# Patient Record
Sex: FEMALE | Race: WHITE
[De-identification: names, ages, dates, MRNs, and addresses within clinical notes are randomized per-mention and may not be internally consistent; named-entity substitution may affect disease eponyms.]

---

## 2018-07-11 ENCOUNTER — HOSPITAL ENCOUNTER (INPATIENT)
Dept: HOSPITAL 1 - ED | Age: 58
LOS: 3 days | Discharge: HOME | DRG: 233 | End: 2018-07-14
Attending: FAMILY MEDICINE | Admitting: FAMILY MEDICINE
Payer: COMMERCIAL

## 2018-07-11 VITALS — HEIGHT: 62.99 IN | BODY MASS INDEX: 42.73 KG/M2 | WEIGHT: 241.19 LBS

## 2018-07-11 DIAGNOSIS — I10: ICD-10-CM

## 2018-07-11 DIAGNOSIS — Z88.6: ICD-10-CM

## 2018-07-11 DIAGNOSIS — E78.00: ICD-10-CM

## 2018-07-11 DIAGNOSIS — K35.2: Primary | ICD-10-CM

## 2018-07-11 DIAGNOSIS — Z90.49: ICD-10-CM

## 2018-07-11 DIAGNOSIS — J45.909: ICD-10-CM

## 2018-07-11 DIAGNOSIS — E78.1: ICD-10-CM

## 2018-07-11 DIAGNOSIS — E66.01: ICD-10-CM

## 2018-07-11 DIAGNOSIS — Z98.51: ICD-10-CM

## 2018-07-11 DIAGNOSIS — F32.9: ICD-10-CM

## 2018-07-11 DIAGNOSIS — F17.210: ICD-10-CM

## 2018-07-11 DIAGNOSIS — E11.65: ICD-10-CM

## 2018-07-12 VITALS — SYSTOLIC BLOOD PRESSURE: 99 MMHG | DIASTOLIC BLOOD PRESSURE: 70 MMHG

## 2018-07-12 VITALS — DIASTOLIC BLOOD PRESSURE: 54 MMHG | SYSTOLIC BLOOD PRESSURE: 102 MMHG

## 2018-07-12 VITALS — DIASTOLIC BLOOD PRESSURE: 53 MMHG | SYSTOLIC BLOOD PRESSURE: 98 MMHG

## 2018-07-12 VITALS — DIASTOLIC BLOOD PRESSURE: 68 MMHG | SYSTOLIC BLOOD PRESSURE: 116 MMHG

## 2018-07-12 VITALS — DIASTOLIC BLOOD PRESSURE: 72 MMHG | SYSTOLIC BLOOD PRESSURE: 124 MMHG

## 2018-07-12 LAB
ALBUMIN SERPL-MCNC: 3.9 G/DL (ref 3.4–5)
ALP SERPL-CCNC: 72 U/L (ref 46–116)
ALT SERPL-CCNC: 28 U/L (ref 14–59)
AMPHETAMINES UR QL SCN: (no result)
AMYLASE SERPL-CCNC: 47 U/L (ref 25–115)
AST SERPL-CCNC: 17 U/L (ref 15–37)
BASOPHILS NFR BLD: 0.5 % (ref 0–2)
BILIRUB SERPL-MCNC: 0.34 MG/DL (ref 0.2–1)
BUN SERPL-MCNC: 11 MG/DL (ref 7–18)
CALCIUM SERPL-MCNC: 9.5 MG/DL (ref 8.5–10.1)
CHLORIDE SERPL-SCNC: 99 MMOL/L (ref 98–107)
CHOLEST SERPL-MCNC: 147 MG/DL (ref ?–200)
CHOLEST/HDLC SERPL: 4.7 MG/DL
CO2 SERPL-SCNC: 27.6 MMOL/L (ref 21–32)
CREAT SERPL-MCNC: 1 MG/DL (ref 0.6–1)
ERYTHROCYTE [DISTWIDTH] IN BLOOD BY AUTOMATED COUNT: 14.2 % (ref 11.5–14.5)
GFR SERPLBLD BASED ON 1.73 SQ M-ARVRAT: > 60 ML/MIN
GLUCOSE SERPL-MCNC: 124 MG/DL (ref 74–106)
HDLC SERPL-MCNC: 31 MG/DL (ref 40–60)
LIPASE SERPL-CCNC: 212 IU/L (ref 73–393)
MAGNESIUM SERPL-MCNC: 1.9 MG/DL (ref 1.8–2.4)
MICROSCOPIC UR-IMP: NO
PHOSPHATE SERPL-MCNC: 4.1 MG/DL (ref 2.5–4.9)
PLATELET # BLD: 238 X10^3MCL (ref 130–400)
POTASSIUM SERPL-SCNC: 3.8 MMOL/L (ref 3.5–5.1)
PROT SERPL-MCNC: 7.6 G/DL (ref 6.4–8.2)
RBC # UR STRIP.AUTO: NEGATIVE /UL
SODIUM SERPL-SCNC: 136 MMOL/L (ref 136–145)
T3 SERPL-MCNC: 1.41 NG/ML
T3RU NFR SERPL: 33 % UPTAKE (ref 30–39)
T4 FREE SERPL-MCNC: 0.93 NG/DL (ref 0.76–1.46)
T4 SERPL-MCNC: 6.5 UG/DL (ref 4.7–13.3)
T4/T3 UPTAKE INDEX SERPL: 2.1 UG/DL (ref 1.4–4.5)
TRIGL SERPL-MCNC: 293 MG/DL (ref ?–150)
UA SPECIFIC GRAVITY: 1.02 (ref 1–1.03)

## 2018-07-12 PROCEDURE — 0DTJ4ZZ RESECTION OF APPENDIX, PERCUTANEOUS ENDOSCOPIC APPROACH: ICD-10-PCS | Performed by: SURGERY

## 2018-07-13 VITALS — SYSTOLIC BLOOD PRESSURE: 108 MMHG | DIASTOLIC BLOOD PRESSURE: 58 MMHG

## 2018-07-13 VITALS — DIASTOLIC BLOOD PRESSURE: 55 MMHG | SYSTOLIC BLOOD PRESSURE: 96 MMHG

## 2018-07-13 VITALS — SYSTOLIC BLOOD PRESSURE: 105 MMHG | DIASTOLIC BLOOD PRESSURE: 60 MMHG

## 2018-07-13 VITALS — SYSTOLIC BLOOD PRESSURE: 111 MMHG | DIASTOLIC BLOOD PRESSURE: 54 MMHG

## 2018-07-13 VITALS — SYSTOLIC BLOOD PRESSURE: 128 MMHG | DIASTOLIC BLOOD PRESSURE: 68 MMHG

## 2018-07-13 LAB
BASOPHILS NFR BLD: 0 % (ref 0–2)
BUN SERPL-MCNC: 14 MG/DL (ref 7–18)
CALCIUM SERPL-MCNC: 9.2 MG/DL (ref 8.5–10.1)
CHLORIDE SERPL-SCNC: 98 MMOL/L (ref 98–107)
CO2 SERPL-SCNC: 26.5 MMOL/L (ref 21–32)
CREAT SERPL-MCNC: 1.1 MG/DL (ref 0.6–1)
ERYTHROCYTE [DISTWIDTH] IN BLOOD BY AUTOMATED COUNT: 14.3 % (ref 11.5–14.5)
GFR SERPLBLD BASED ON 1.73 SQ M-ARVRAT: 55 ML/MIN
GLUCOSE SERPL-MCNC: 142 MG/DL (ref 74–106)
MAGNESIUM SERPL-MCNC: 2 MG/DL (ref 1.8–2.4)
MONOCYTES NFR BLD: 6 % (ref 0–7)
NEUTS BAND NFR BLD: 5 % (ref 0–10)
NEUTS SEG NFR BLD MANUAL: 78 % (ref 37–75)
PHOSPHATE SERPL-MCNC: 4.2 MG/DL (ref 2.5–4.9)
PLAT MORPH BLD: (no result)
PLATELET # BLD: 222 X10^3MCL (ref 130–400)
POTASSIUM SERPL-SCNC: 4.2 MMOL/L (ref 3.5–5.1)
SODIUM SERPL-SCNC: 136 MMOL/L (ref 136–145)

## 2018-07-14 VITALS — SYSTOLIC BLOOD PRESSURE: 111 MMHG | DIASTOLIC BLOOD PRESSURE: 53 MMHG

## 2018-07-14 VITALS — SYSTOLIC BLOOD PRESSURE: 116 MMHG | DIASTOLIC BLOOD PRESSURE: 60 MMHG

## 2018-07-14 VITALS — DIASTOLIC BLOOD PRESSURE: 63 MMHG | SYSTOLIC BLOOD PRESSURE: 108 MMHG

## 2018-07-14 LAB
BASOPHILS NFR BLD: 0.2 % (ref 0–2)
BUN SERPL-MCNC: 13 MG/DL (ref 7–18)
CALCIUM SERPL-MCNC: 8.2 MG/DL (ref 8.5–10.1)
CHLORIDE SERPL-SCNC: 102 MMOL/L (ref 98–107)
CO2 SERPL-SCNC: 29.4 MMOL/L (ref 21–32)
CREAT SERPL-MCNC: 1 MG/DL (ref 0.6–1)
ERYTHROCYTE [DISTWIDTH] IN BLOOD BY AUTOMATED COUNT: 14.2 % (ref 11.5–14.5)
GFR SERPLBLD BASED ON 1.73 SQ M-ARVRAT: > 60 ML/MIN
GLUCOSE SERPL-MCNC: 113 MG/DL (ref 74–106)
PLATELET # BLD: 192 X10^3MCL (ref 130–400)
POTASSIUM SERPL-SCNC: 4.1 MMOL/L (ref 3.5–5.1)
SODIUM SERPL-SCNC: 138 MMOL/L (ref 136–145)

## 2018-12-29 ENCOUNTER — HOSPITAL ENCOUNTER (INPATIENT)
Dept: HOSPITAL 1 - ED | Age: 58
LOS: 3 days | Discharge: HOME | DRG: 140 | End: 2019-01-01
Attending: INTERNAL MEDICINE | Admitting: INTERNAL MEDICINE
Payer: COMMERCIAL

## 2018-12-29 VITALS — BODY MASS INDEX: 43.53 KG/M2 | HEIGHT: 62 IN | WEIGHT: 236.56 LBS

## 2018-12-29 DIAGNOSIS — F33.9: ICD-10-CM

## 2018-12-29 DIAGNOSIS — E78.00: ICD-10-CM

## 2018-12-29 DIAGNOSIS — Z88.5: ICD-10-CM

## 2018-12-29 DIAGNOSIS — J96.01: ICD-10-CM

## 2018-12-29 DIAGNOSIS — J44.1: Primary | ICD-10-CM

## 2018-12-29 DIAGNOSIS — E78.5: ICD-10-CM

## 2018-12-29 DIAGNOSIS — G47.33: ICD-10-CM

## 2018-12-29 DIAGNOSIS — F17.210: ICD-10-CM

## 2018-12-29 DIAGNOSIS — Z71.6: ICD-10-CM

## 2018-12-29 DIAGNOSIS — N17.0: ICD-10-CM

## 2018-12-29 DIAGNOSIS — D72.829: ICD-10-CM

## 2018-12-29 DIAGNOSIS — I10: ICD-10-CM

## 2018-12-29 DIAGNOSIS — E11.65: ICD-10-CM

## 2018-12-30 VITALS — SYSTOLIC BLOOD PRESSURE: 138 MMHG | DIASTOLIC BLOOD PRESSURE: 75 MMHG

## 2018-12-30 VITALS — DIASTOLIC BLOOD PRESSURE: 97 MMHG | SYSTOLIC BLOOD PRESSURE: 127 MMHG

## 2018-12-30 VITALS — SYSTOLIC BLOOD PRESSURE: 153 MMHG | DIASTOLIC BLOOD PRESSURE: 79 MMHG

## 2018-12-30 VITALS — DIASTOLIC BLOOD PRESSURE: 74 MMHG | SYSTOLIC BLOOD PRESSURE: 149 MMHG

## 2018-12-30 VITALS — DIASTOLIC BLOOD PRESSURE: 62 MMHG | SYSTOLIC BLOOD PRESSURE: 133 MMHG

## 2018-12-30 VITALS — DIASTOLIC BLOOD PRESSURE: 75 MMHG | SYSTOLIC BLOOD PRESSURE: 150 MMHG

## 2018-12-30 LAB
ALBUMIN SERPL-MCNC: 3.5 G/DL (ref 3.4–5)
ALP SERPL-CCNC: 70 U/L (ref 46–116)
ALT SERPL-CCNC: 23 U/L (ref 14–59)
AMYLASE SERPL-CCNC: 42 U/L (ref 25–115)
AST SERPL-CCNC: 15 U/L (ref 15–37)
BASOPHILS NFR BLD: 0.6 % (ref 0–2)
BILIRUB SERPL-MCNC: 0.26 MG/DL (ref 0.2–1)
BUN SERPL-MCNC: 13 MG/DL (ref 7–18)
CALCIUM SERPL-MCNC: 8.2 MG/DL (ref 8.5–10.1)
CHLORIDE SERPL-SCNC: 102 MMOL/L (ref 98–107)
CHOLEST SERPL-MCNC: 189 MG/DL (ref ?–200)
CHOLEST/HDLC SERPL: 6.5 MG/DL
CO2 SERPL-SCNC: 25.7 MMOL/L (ref 21–32)
CREAT SERPL-MCNC: 1.1 MG/DL (ref 0.6–1)
ERYTHROCYTE [DISTWIDTH] IN BLOOD BY AUTOMATED COUNT: 15.1 % (ref 11.5–14.5)
GFR SERPLBLD BASED ON 1.73 SQ M-ARVRAT: 55 ML/MIN
GLUCOSE SERPL-MCNC: 165 MG/DL (ref 74–106)
HDLC SERPL-MCNC: 29 MG/DL (ref 40–60)
LIPASE SERPL-CCNC: 223 IU/L (ref 73–393)
MAGNESIUM SERPL-MCNC: 1.9 MG/DL (ref 1.8–2.4)
PHOSPHATE SERPL-MCNC: 4 MG/DL (ref 2.5–4.9)
PLATELET # BLD: 224 X10^3MCL (ref 130–400)
POTASSIUM SERPL-SCNC: 3.7 MMOL/L (ref 3.5–5.1)
PROT SERPL-MCNC: 7.2 G/DL (ref 6.4–8.2)
SODIUM SERPL-SCNC: 138 MMOL/L (ref 136–145)
T3 SERPL-MCNC: 1.3 NG/ML
T3RU NFR SERPL: 31 % UPTAKE (ref 30–39)
T4 FREE SERPL-MCNC: 0.65 NG/DL (ref 0.76–1.46)
T4 SERPL-MCNC: 6.8 UG/DL (ref 4.7–13.3)
T4/T3 UPTAKE INDEX SERPL: 2.1 UG/DL (ref 1.4–4.5)
TRIGL SERPL-MCNC: 311 MG/DL (ref ?–150)

## 2018-12-30 NOTE — NUR
PT REPORT RECIEVED TO ASSUME PT CARE. PT RESTING IN A POSITION OF COMFORT,
AOX4, RESP EVEN AND UNLABORED, NO ACUTE DISTRESS NOTED AT THIS TIME.

## 2018-12-30 NOTE — NUR
PT STATES IV FEELS TENDER. IV SITE CDI AND PATENT WITH NO S/S OF INFILTRATION,
REDNESS, EDEMA, OR DRAINAGE AND FLUSHES WITHOUT DIFFICULTY WITH 10ML OF NS.
OFFERED OPTION OF STARTING IV TO DIFFERENT SITE, PATIENT CURRENTLY REFUSING.
PT AWAKE, ALERT, RESPIRATIONS EVEN AND UNLABORED, NO S/S OF DISTRESS NOTED.
SAFETY PRECAUTIONS IN PLACE.

## 2018-12-30 NOTE — NUR
SPOKE WITH DR ECHEVERRIA RE 2ND EKG ORDER. PER DR BANKS, ONLY ONE EKG IS NEEDED
AND IT HAS ALREADY BEEN COMPLETED.

## 2018-12-30 NOTE — NUR
PT TRANSFERRED TO  [address] BY [facility] BY MYSELF AND [facility] EMT. PT ON CARDIAC
MONITOR FOR TRANSPORT. PT AOX4, RESP EVEN AND UNLABORED, NO ACUTE DISTRESS
NOTED AT THIS TIME. PT AMBULATED FROM [facility] TO BED WITH STEADY GAIT. DUDLEY TOMPKINS
ACCEPTED PT AT BEDSIDE HANDOFF.

## 2018-12-30 NOTE — NUR
PT AWAKE, ALERT, CURRENTLY SITTING IN BED, RESPIRATIONS EVEN AND UNLABORED, NO
S/S OF DISTRESS NOTED. MEDICATED PER EMAR FOR ELEVATED BLOOD SUGAR AND
EDUCATED ON S/S OF HYPOGLYCEMIA AS WELL AS NEED TO EAT LUNCH, PATIENT
VERBALIZES UNDERSTANDING. FAMILY AT BEDSIDE. SAFETY PRECAUTIONS IN PLACE.

## 2018-12-30 NOTE — NUR
ENDORSED CARE TO NIGHT SHIFT NURSE, ALL QUESTIONS AND CONCERNS WERE ADDRESSED.
PT AWAKE, ALERT, RESPIRATIONS EVEN AND UNLABORED, NO S/S OF DISTRESS NOTED.
SAFETY PRECAUTIONS IN PLACE.

## 2018-12-30 NOTE — NUR
RECEIVED PT LAYING IN BED, NO ACUTE DISTRESS NOTED. DENIES PAIN OR DISCOMFORT,
BREATHING ON RA, EVEN AND UNLABORED, DENIES SOB OR DYSPNEA, O2 SAT 98, MILD
EXPIRATORY WHEEZING NOTED, RT PROTOCOL IN PLACE. AA/OX4, ABLE TO MAKE NEEDS
KNOWN. NSR TO TELE #30, NO CP. PULSES PRESENT AND EQUAL THROUGHOUT, NO EDEMA
NOTED. ABD ROUND AND SOFT WITH ACTIVE BOWEL SOUNDS, DENIES N/V/D. FREELY VOIDS
URINE. AMBULATORY AND ABLE TO REPOSITION SELF IN BED. IV TO RAC IN PLACE, DRY,
PATENT, INTACT, S/L AT THIS TIME, NO PAIN, REDNESS, OR SWELLING NOTED WHEN
FLUSHED WITH NS. COMFORT AND SAFETY MEASURES IN PLACE. ALL NEEDS ASSESSED AND
ATTENDED TO. CALL LIGHT WITHIN REACH. WILL CONTINUE TO MONITOR

## 2018-12-30 NOTE — NUR
PT'S BEDSIDE BLOOD SUGAR, 190 THIS MORNING. PT REFUSED INSULIN SLIDING SCALE
COVERAGE STATING SHE IS NO LONGER DIABETIC, JUST PRE DIABETIC. PT ALSO
UNERSTANDS THAT HER SUGAR IS HIGH DUE TO STEROID (SOLU MEDROL) RECEIVED IN ER.
DR. LA MADE AWARE. PT DENIES ANY PAIN OR DISCOMFORT AT THIS TIME. DENIES
SOB OR DYSPNEA. COMFORT AND SAFETY MEASURES MAINTAINED. ALL NEEDS ASSESSED AND
ATTENDED TO. CALL LIGHT WITHIN REACH. WILL ENDORSE CARE TO DAY SHIFT NURSE

## 2018-12-30 NOTE — NUR
PT REQUESTING TO SHOWER AT THIS TIME. OK PER DOCTOR'S ORDERS. IV SITE WRAPPED,
TELE REMOVED. GAIT STRONG AND STEADY. NO DISTRESS NOTED. WILL CONTINUE TO
MONITOR

## 2018-12-30 NOTE — NUR
RECIEVED PT FROM NIGHT SHIFT NURSE. PT SITTING UP AT BEDSIDE LEANING OVER
BEDSIDE TABLE. A/OX4. TELE #30. NSR, HR 91. DENIES ANY CHEST PAIN.
PULSES PALP. SLIGHT EDEMA NOTED TO BILAT LE. RESPIRATIONS EQUAL AND LABORED ON
RA. PT C/O SOB THAT IMPROVES WITH BREATHING TREATMENTS. ACTIVE BS. DENIES N/V.
AMBULATORY WITH BRP. SKIN W/D/I. DENIES ANY PAIN AT THIS TIME. IV SALINE
LOCKED TO RT AC. NO SWELLING OR REDNESS NOTED. CALL LIGHT IN REACH. BED IN
LOWEST POSITION. WILL CONTINUE TO MONITOR.

## 2018-12-30 NOTE — NUR
PT OUT OF THE SHOWER AND BACK TO BED WITHOUT INCIDENT. NEW IV TO LFA STARTED
BY LEDA BAPTISTE, DRY, PATENT, INTACT WITH GOOD BLOOD RETURN, FLUSHING WELL. PLACED
BACK ON TELE. NO ACUTE DISTRESS. CALL LIGHT WITHIN REACH. WILL CONTINUE TO
MONITOR

## 2018-12-30 NOTE — NUR
PT CURRENTLY SITTING IN BED WATCHING TV, RESPIRATIONS EVEN AND UNLABORED, NO
S/S OF DISTRESS NOTED. SAFETY PRECAUTIONS IN PLACE.

## 2018-12-30 NOTE — NUR
REC'D A 58/F IN RM 5 WITH C/O PROGRESSIVELY WORSE SOB X 3 DAYS. HX OF CHRONIC
ASTHMA. PT DENIES FEVER, COUGH. PT AAOX4, CLEAR SPEECH, RESP E/U, MATT
EXPIRATORY WHEEZE, ON CM.

## 2018-12-30 NOTE — NUR
MEDICATED PER EMAR. PT AWAKE, ALERT, RESPIRATIONS EVEN AND UNLABORED, NO S/S
OF DISTRESS NOTED, PATIENT REPORTS SLIGHT SOB. RT CURRENTLY GIVING TREATMENT.
IV SITE CDI AND PATENT. BED IN LOWEST POSITION, CALL LIGHT WITHIN REACH, 2
RAILS UP. SAFETY PRECAUTIONS IN PLACE.

## 2018-12-31 VITALS — DIASTOLIC BLOOD PRESSURE: 76 MMHG | SYSTOLIC BLOOD PRESSURE: 131 MMHG

## 2018-12-31 VITALS — DIASTOLIC BLOOD PRESSURE: 60 MMHG | SYSTOLIC BLOOD PRESSURE: 141 MMHG

## 2018-12-31 VITALS — DIASTOLIC BLOOD PRESSURE: 68 MMHG | SYSTOLIC BLOOD PRESSURE: 160 MMHG

## 2018-12-31 VITALS — SYSTOLIC BLOOD PRESSURE: 105 MMHG | DIASTOLIC BLOOD PRESSURE: 56 MMHG

## 2018-12-31 VITALS — SYSTOLIC BLOOD PRESSURE: 107 MMHG | DIASTOLIC BLOOD PRESSURE: 59 MMHG

## 2018-12-31 LAB
BASOPHILS NFR BLD: 0 % (ref 0–2)
BUN SERPL-MCNC: 18 MG/DL (ref 7–18)
CALCIUM SERPL-MCNC: 8.7 MG/DL (ref 8.5–10.1)
CHLORIDE SERPL-SCNC: 99 MMOL/L (ref 98–107)
CO2 SERPL-SCNC: 22.5 MMOL/L (ref 21–32)
CREAT SERPL-MCNC: 1.1 MG/DL (ref 0.6–1)
ERYTHROCYTE [DISTWIDTH] IN BLOOD BY AUTOMATED COUNT: 14.9 % (ref 11.5–14.5)
GFR SERPLBLD BASED ON 1.73 SQ M-ARVRAT: 55 ML/MIN
GLUCOSE SERPL-MCNC: 239 MG/DL (ref 74–106)
MAGNESIUM SERPL-MCNC: 2 MG/DL (ref 1.8–2.4)
MICROSCOPIC UR-IMP: NO
PHOSPHATE SERPL-MCNC: 2.9 MG/DL (ref 2.5–4.9)
PLATELET # BLD: 239 X10^3MCL (ref 130–400)
POTASSIUM SERPL-SCNC: 3.7 MMOL/L (ref 3.5–5.1)
RBC # UR STRIP.AUTO: NEGATIVE /UL
SODIUM SERPL-SCNC: 133 MMOL/L (ref 136–145)
UA SPECIFIC GRAVITY: 1.01 (ref 1–1.03)

## 2018-12-31 NOTE — NUR
NO SIGNIFICANT CHANGES TO REPORT, PT COMPLIED WITH NURSING CARE THROUGHOUT THE
SHIFT WITH NO ACUTE EVENTS OVER NIGHT. PT LAYING IN BED AT THIS TIME, WATCHING
TV, BREATHING EVEN AND UNLABORED, NO ACUTE DISTRESS NOTED. COMFORT AND SAFETY
MEASURES MAINTAINED. ALL NEEDS ASSESSED AND ATTENDED TO. CALL LIGHT WITHIN
REACH. WILL ENDORSE CARE TO DAY SHIFT NURSE

## 2018-12-31 NOTE — NUR
PT REQUESTED TO HAVE BLOOD SUGAR CHECKED PRIOR TO GIVING SCHEDULED DOSE OF
SOLUMEDROL IVP, BLOOD SUGAR 268. NO ACUTE DISTRESS NOTED, CALL LIGHT WITHIN
REACH. WILL CONTINUE TO MONITOR

## 2018-12-31 NOTE — NUR
PT RECIEVED AAO REG RESP NO SOB,PT R/A SAT 97%,V/S STABLE,KEPT CLEAN AND DRY
TO TOUCH,HL TO RAC THE SITE PATENT AND INTACT,PT ON TELE MONITOR AND IN NSR NO
ECTOPY OR CHEST PAIN AT THIS TIME,KEPT CLEAN AND DRY TO TOUCH AND CALL LIGHT
EASY REACHED AND WILL CONTINUE TO MONITOR.

## 2018-12-31 NOTE — NUR
PATIENT SITTING UP IN BED AND BLOOD SUGAR AT THIS TIME  AND WILL GIVE
INSULIN AS ORDERED. PATIENT DENIES ANY ACUTE DISTRESS AT THIS TIME. WILL
CONTINUE TO MONITOR. 7

## 2018-12-31 NOTE — NUR
PATIENT HAS NOT HAD ANY COMPLAINTS OF PAIN AND TOLERATED DIET AND FLUIDS. SHE
HAS HAD ELEVATION ON HER GLUCOSE AND NOTED DIABETIC AS WELL AS ON SOLUMEDROL
AT THIS TIME. NO ACUTE RESPIRATORY DISTRESS AT THIS TIME.

## 2018-12-31 NOTE — NUR
Discharge instructions given to patient by DM ALTMAN. Mother verbalized understanding of discharge instructions. Pt discharged without difficulty. Pt. Discharged in stable condition carried by mother , accompanied by mother and family. RECEIVED PATIENT ALERT AND ORIENTED TIMES FOUR. REQUESTED BLOOD SUGAR AND DID
AS PER PATIENT REQUEST. NOT SURPRISED THAT IS ELEVATED AS THE PATIENT HAS BEEN
ON SOLUMEDROL. SHE JUST WANTED TO SEE. SHE ALSO ASKED FOR INSULIN. ADVISED
THAT THERE IS COVERAGE ONLY EVERY SIX HOURS AND WILL NOT BE GIVING AT THIS
TIME. PATIENT AHS DIMINISHED BREATHS SOUNDS AND SOME EXPIRATORY WHEEZING IS
HEARD TO BOTH UPPER LOBES OF THE LUNGS. PATIENT HAS A DISTENDED BUT SOFT
ABDOMEN AND BOWEL SOUNDS ACTIVE. PATIENT HAS EDEMA TO THE LOWER EXTREMITIES OF
1 PLUS AND PATIENT IS AMBUALTORY. VITALS AT THIS TIME ARE AT 98.3, 91, 20,
105/56, 98% ON ROOM AIR. PATIENT HAS LOW BP AND HELD THE LISINOPRIL AS
INDICATED. PATIENT WAS GIVEN THE LASIX AS ORDERED. PATIENT AHS NOTED BLOOD
SUGAR THIS AM AT [address] [place] COVERAGE WAS GIVEN. NOTED WBC IS AT 17.5. DENIES PAIN
AT THIS TIME AND DENIES ANY ACUTE SOB. WILL CONTINUE TO MONITOR AS INDICATED.

## 2019-01-01 VITALS — SYSTOLIC BLOOD PRESSURE: 133 MMHG | DIASTOLIC BLOOD PRESSURE: 63 MMHG

## 2019-01-01 VITALS — DIASTOLIC BLOOD PRESSURE: 70 MMHG | SYSTOLIC BLOOD PRESSURE: 127 MMHG

## 2019-01-01 VITALS — SYSTOLIC BLOOD PRESSURE: 128 MMHG | DIASTOLIC BLOOD PRESSURE: 66 MMHG

## 2019-01-01 LAB
BASOPHILS NFR BLD: 0 % (ref 0–2)
BUN SERPL-MCNC: 22 MG/DL (ref 7–18)
CALCIUM SERPL-MCNC: 8.6 MG/DL (ref 8.5–10.1)
CHLORIDE SERPL-SCNC: 100 MMOL/L (ref 98–107)
CO2 SERPL-SCNC: 25.5 MMOL/L (ref 21–32)
CREAT SERPL-MCNC: 1.1 MG/DL (ref 0.6–1)
ERYTHROCYTE [DISTWIDTH] IN BLOOD BY AUTOMATED COUNT: 15.5 % (ref 11.5–14.5)
GFR SERPLBLD BASED ON 1.73 SQ M-ARVRAT: 55 ML/MIN
GLUCOSE SERPL-MCNC: 222 MG/DL (ref 74–106)
MAGNESIUM SERPL-MCNC: 2.2 MG/DL (ref 1.8–2.4)
NEUTS BAND NFR BLD: 4 % (ref 0–10)
NEUTS SEG NFR BLD MANUAL: 93 % (ref 37–75)
PHOSPHATE SERPL-MCNC: 3.2 MG/DL (ref 2.5–4.9)
PLAT MORPH BLD: (no result)
PLATELET # BLD: 242 X10^3MCL (ref 130–400)
POTASSIUM SERPL-SCNC: 4.2 MMOL/L (ref 3.5–5.1)
RBC MORPH BLD: (no result)
SODIUM SERPL-SCNC: 136 MMOL/L (ref 136–145)

## 2019-01-01 NOTE — NUR
RECEIVED PT IN BED. ASSESSED AND DOCUMENTED. DENIES PAIN THIS TIME. NO SOB
NOTED. SAFTEY PRECAUTIONS ARE IN PLACE. WILL MONITOR.

## 2019-01-01 NOTE — NUR
DISCHARGE INSTRUCTIONS AND PRECSRIPTIONS GIVEN. PB SIGNED AND SENT WITH PT.
IV REMOVED AND DRESSING APPLIED. TELE REMOVED AND RETURNED. PT DENIES ANY
PAIN. NO SOB NOTED. PT SAID SHE HAS HER CAR HERE AND SHE IS GOOD TO DRIVE,
FEEL GOOD. PT IS STABLE. CNA OFFERED WHEELCHAIR BUT PT SAID SHE WANTS TO WALK.
CNA WALK WITH PT TO LOBBY. PT DC HOME.

## 2019-01-26 ENCOUNTER — HOSPITAL ENCOUNTER (EMERGENCY)
Dept: HOSPITAL 1 - ED | Age: 59
Discharge: HOME | End: 2019-01-26
Payer: COMMERCIAL

## 2019-01-26 VITALS — BODY MASS INDEX: 41.77 KG/M2 | WEIGHT: 227 LBS | HEIGHT: 62 IN

## 2019-01-26 VITALS — DIASTOLIC BLOOD PRESSURE: 60 MMHG | SYSTOLIC BLOOD PRESSURE: 116 MMHG

## 2019-01-26 DIAGNOSIS — J98.01: ICD-10-CM

## 2019-01-26 DIAGNOSIS — F17.210: ICD-10-CM

## 2019-01-26 DIAGNOSIS — J44.1: Primary | ICD-10-CM

## 2019-11-17 ENCOUNTER — HOSPITAL ENCOUNTER (EMERGENCY)
Dept: HOSPITAL 1 - ED | Age: 59
Discharge: HOME | End: 2019-11-17
Payer: COMMERCIAL

## 2019-11-17 VITALS — SYSTOLIC BLOOD PRESSURE: 130 MMHG | DIASTOLIC BLOOD PRESSURE: 54 MMHG

## 2019-11-17 VITALS — BODY MASS INDEX: 32.43 KG/M2 | HEIGHT: 68 IN | WEIGHT: 214 LBS

## 2019-11-17 DIAGNOSIS — E78.00: ICD-10-CM

## 2019-11-17 DIAGNOSIS — Z88.2: ICD-10-CM

## 2019-11-17 DIAGNOSIS — Z88.6: ICD-10-CM

## 2019-11-17 DIAGNOSIS — J44.1: Primary | ICD-10-CM

## 2019-11-17 DIAGNOSIS — F32.9: ICD-10-CM

## 2019-12-13 ENCOUNTER — HOSPITAL ENCOUNTER (EMERGENCY)
Dept: HOSPITAL 1 - ED | Age: 59
Discharge: HOME | End: 2019-12-13
Payer: COMMERCIAL

## 2019-12-13 VITALS
WEIGHT: 212 LBS | HEIGHT: 63 IN | HEIGHT: 63 IN | BODY MASS INDEX: 37.56 KG/M2 | WEIGHT: 212 LBS | BODY MASS INDEX: 37.56 KG/M2

## 2019-12-13 VITALS — SYSTOLIC BLOOD PRESSURE: 157 MMHG | DIASTOLIC BLOOD PRESSURE: 78 MMHG

## 2019-12-13 DIAGNOSIS — J45.909: ICD-10-CM

## 2019-12-13 DIAGNOSIS — Z91.040: ICD-10-CM

## 2019-12-13 DIAGNOSIS — Z88.5: ICD-10-CM

## 2019-12-13 DIAGNOSIS — I10: ICD-10-CM

## 2019-12-13 DIAGNOSIS — F17.200: ICD-10-CM

## 2019-12-13 DIAGNOSIS — F32.9: ICD-10-CM

## 2019-12-13 DIAGNOSIS — J45.901: Primary | ICD-10-CM

## 2019-12-13 DIAGNOSIS — E78.00: ICD-10-CM

## 2019-12-13 DIAGNOSIS — Z71.6: ICD-10-CM

## 2020-02-16 ENCOUNTER — HOSPITAL ENCOUNTER (EMERGENCY)
Dept: HOSPITAL 26 - MED | Age: 60
Discharge: HOME | End: 2020-02-16
Payer: MEDICAID

## 2020-02-16 VITALS — SYSTOLIC BLOOD PRESSURE: 160 MMHG | DIASTOLIC BLOOD PRESSURE: 78 MMHG

## 2020-02-16 VITALS — BODY MASS INDEX: 31.89 KG/M2 | HEIGHT: 63 IN | WEIGHT: 180 LBS

## 2020-02-16 VITALS — DIASTOLIC BLOOD PRESSURE: 62 MMHG | SYSTOLIC BLOOD PRESSURE: 134 MMHG

## 2020-02-16 DIAGNOSIS — K05.00: Primary | ICD-10-CM

## 2020-02-16 DIAGNOSIS — K13.79: ICD-10-CM

## 2020-02-16 DIAGNOSIS — F17.210: ICD-10-CM

## 2020-02-16 PROCEDURE — 96372 THER/PROPH/DIAG INJ SC/IM: CPT

## 2020-02-16 PROCEDURE — 99283 EMERGENCY DEPT VISIT LOW MDM: CPT

## 2020-02-16 NOTE — NUR
Patient discharged with v/s stable. Written and verbal after care instructions 
given and explained. 

Patient alert, oriented and verbalized understanding of instructions. 
Ambulatory with steady gait. All questions addressed prior to discharge. ID 
band removed. Patient advised to follow up with PMD. Rx of NAPROSYN & 
PENICILLIN given. Patient educated on indication of medication including 
possible reaction and side effects. Opportunity to ask questions provided and 
answered.

## 2020-02-16 NOTE — NUR
PT C/O OF LEFT SIDE TOOTHACHE X MONTHS. RATES PAIN 10/10 AND DESCRIBES IT AS 
SHOOTING. PT SAYS TODAY THE TOOTHACHE WAS UNTOLERABLE. TOOK IBUPROFEN 800MG AT 
2000 LAST NIGHT. LEFT SIDE OF FACE SHOWS SWELLING. NO OBVIOUS DEFORMITY NOTED. 
DENIES ANY TRUAMA OR INJURY TO THE FACE.



ALLERGIES: LATEX GLOVES.



PMH: PREDIABETIC, ASTHMA, HTN.